# Patient Record
Sex: FEMALE | Race: WHITE | NOT HISPANIC OR LATINO | URBAN - NONMETROPOLITAN AREA
[De-identification: names, ages, dates, MRNs, and addresses within clinical notes are randomized per-mention and may not be internally consistent; named-entity substitution may affect disease eponyms.]

---

## 2019-01-30 ENCOUNTER — IMPORTED ENCOUNTER (OUTPATIENT)
Dept: URBAN - NONMETROPOLITAN AREA CLINIC 1 | Facility: CLINIC | Age: 84
End: 2019-01-30

## 2019-01-30 PROCEDURE — 92083 EXTENDED VISUAL FIELD XM: CPT

## 2019-01-30 PROCEDURE — 99214 OFFICE O/P EST MOD 30 MIN: CPT

## 2019-01-30 NOTE — PATIENT DISCUSSION
IOLs OU + s/p yag pc REPOAG OU for many years - stable. Pre-tx IOP was 25/27 in 2000. Continue latanoprost OU.s/p macular hole RE and had sx by Dr Benjamin Cardona. Alternating ET. BF rx given last visit - (with some change in LE). Rx given today for magnied readers (with +3.00 over). RTC x 6 months for Ta dilate and ON OCT.; Dr's Notes: Has a sore back after picking strawberries - needs injection. Mr Zo Lopez passed away in October.

## 2019-08-05 ENCOUNTER — IMPORTED ENCOUNTER (OUTPATIENT)
Dept: URBAN - NONMETROPOLITAN AREA CLINIC 1 | Facility: CLINIC | Age: 84
End: 2019-08-05

## 2019-08-05 PROCEDURE — 99214 OFFICE O/P EST MOD 30 MIN: CPT

## 2019-08-05 PROCEDURE — 92133 CPTRZD OPH DX IMG PST SGM ON: CPT

## 2019-08-05 NOTE — PATIENT DISCUSSION
IOLs OU + s/p yag pc REPOAG OU for many years - stable. Pre-tx IOP was 25/27 in 2000. Continue latanoprost OU.s/p macular hole RE and had sx by Dr Ian Davenport. Alternating ET. BF rx given last visit - (with some change in LE). Rx given today for magnied readers (with +3.00 over). RTC x 6 months for cee and VF.; Dr's Notes: Has a sore back after picking strawberries - needs injection. Mr Julisa Jiménez passed away in October.

## 2020-02-03 ENCOUNTER — IMPORTED ENCOUNTER (OUTPATIENT)
Dept: URBAN - NONMETROPOLITAN AREA CLINIC 1 | Facility: CLINIC | Age: 85
End: 2020-02-03

## 2020-02-03 PROBLEM — Z96.1: Noted: 2020-02-03

## 2020-02-03 PROBLEM — H35.341: Noted: 2020-02-03

## 2020-02-03 PROBLEM — H50.012: Noted: 2020-02-03

## 2020-02-03 PROBLEM — H40.1131: Noted: 2020-02-03

## 2020-02-03 PROCEDURE — 92083 EXTENDED VISUAL FIELD XM: CPT

## 2020-02-03 PROCEDURE — 92014 COMPRE OPH EXAM EST PT 1/>: CPT

## 2020-02-03 NOTE — PATIENT DISCUSSION
POAG OU for many years - stable. Continue latanoprost OU.s/p macular hole RE and had sx by Dr Rozina Dotson. Alternating ET. Arnol Esposito; Dr's Notes: Has a sore back after picking strawberries - needs injection. Mr Jessica Wallace passed away in October.

## 2020-08-03 ENCOUNTER — IMPORTED ENCOUNTER (OUTPATIENT)
Dept: URBAN - NONMETROPOLITAN AREA CLINIC 1 | Facility: CLINIC | Age: 85
End: 2020-08-03

## 2020-08-03 PROCEDURE — 92012 INTRM OPH EXAM EST PATIENT: CPT

## 2020-08-03 NOTE — PATIENT DISCUSSION
POAG OU for many years - stable. Continue latanoprost OU.stable  iopmonitor for iop changess/p macular hole RE and had sx by Dr Yoanna Wong. Alternating ET. PSE[employer]OPH[employer]IA [employer]OR for pco; 's Notes: Has a sore back after picking strawberries - needs injection. Mr Ami Barahona passed away in October.

## 2022-04-09 ASSESSMENT — VISUAL ACUITY
OD_SC: 20/25
OD_SC: 20/40
OS_SC: 20/30+2
OD_SC: 20/30
OD_SC: 20/25-1
OU_CC: J1
OS_SC: 20/30
OS_SC: 20/40

## 2022-04-09 ASSESSMENT — PACHYMETRY
OD_CT_UM: 534
OS_CT_UM: 538
OS_CT_UM: 538
OD_CT_UM: 534
OS_CT_UM: 538
OS_CT_UM: 538

## 2022-04-09 ASSESSMENT — TONOMETRY
OD_IOP_MMHG: 21
OS_IOP_MMHG: 18
OS_IOP_MMHG: 18
OD_IOP_MMHG: 18
OD_IOP_MMHG: 18
OS_IOP_MMHG: 23
OD_IOP_MMHG: 18
OS_IOP_MMHG: 18